# Patient Record
Sex: FEMALE | Race: WHITE | Employment: UNEMPLOYED | URBAN - METROPOLITAN AREA
[De-identification: names, ages, dates, MRNs, and addresses within clinical notes are randomized per-mention and may not be internally consistent; named-entity substitution may affect disease eponyms.]

---

## 2021-07-09 ENCOUNTER — APPOINTMENT (OUTPATIENT)
Dept: GENERAL RADIOLOGY | Age: 31
End: 2021-07-09

## 2021-07-09 ENCOUNTER — HOSPITAL ENCOUNTER (EMERGENCY)
Age: 31
Discharge: HOME | End: 2021-07-09
Attending: STUDENT IN AN ORGANIZED HEALTH CARE EDUCATION/TRAINING PROGRAM

## 2021-07-09 VITALS
WEIGHT: 185 LBS | BODY MASS INDEX: 29.73 KG/M2 | RESPIRATION RATE: 16 BRPM | SYSTOLIC BLOOD PRESSURE: 128 MMHG | DIASTOLIC BLOOD PRESSURE: 75 MMHG | OXYGEN SATURATION: 100 % | TEMPERATURE: 97.8 F | HEART RATE: 66 BPM | HEIGHT: 66 IN

## 2021-07-09 DIAGNOSIS — F11.93 OPIATE WITHDRAWAL (HCC): Primary | ICD-10-CM

## 2021-07-09 LAB
ABSOLUTE EOS #: 0 K/UL (ref 0–0.4)
ABSOLUTE IMMATURE GRANULOCYTE: ABNORMAL K/UL (ref 0–0.3)
ABSOLUTE LYMPH #: 1.33 K/UL (ref 1–4.8)
ABSOLUTE MONO #: 0.44 K/UL (ref 0.1–1.3)
ALBUMIN SERPL-MCNC: 4.8 G/DL (ref 3.5–5.2)
ALBUMIN/GLOBULIN RATIO: ABNORMAL (ref 1–2.5)
ALP BLD-CCNC: 109 U/L (ref 35–104)
ALT SERPL-CCNC: 23 U/L (ref 5–33)
ANION GAP SERPL CALCULATED.3IONS-SCNC: 13 MMOL/L (ref 9–17)
AST SERPL-CCNC: 28 U/L
BASOPHILS # BLD: 0 % (ref 0–2)
BASOPHILS ABSOLUTE: 0 K/UL (ref 0–0.2)
BILIRUB SERPL-MCNC: 0.5 MG/DL (ref 0.3–1.2)
BUN BLDV-MCNC: 14 MG/DL (ref 6–20)
BUN/CREAT BLD: ABNORMAL (ref 9–20)
CALCIUM SERPL-MCNC: 9.6 MG/DL (ref 8.6–10.4)
CHLORIDE BLD-SCNC: 100 MMOL/L (ref 98–107)
CO2: 23 MMOL/L (ref 20–31)
CREAT SERPL-MCNC: 0.7 MG/DL (ref 0.5–0.9)
DIFFERENTIAL TYPE: ABNORMAL
EOSINOPHILS RELATIVE PERCENT: 0 % (ref 0–4)
GFR AFRICAN AMERICAN: >60 ML/MIN
GFR NON-AFRICAN AMERICAN: >60 ML/MIN
GFR SERPL CREATININE-BSD FRML MDRD: ABNORMAL ML/MIN/{1.73_M2}
GFR SERPL CREATININE-BSD FRML MDRD: ABNORMAL ML/MIN/{1.73_M2}
GLUCOSE BLD-MCNC: 135 MG/DL (ref 70–99)
HCG QUALITATIVE: NEGATIVE
HCT VFR BLD CALC: 43 % (ref 36–46)
HEMOGLOBIN: 14.5 G/DL (ref 12–16)
IMMATURE GRANULOCYTES: ABNORMAL %
LYMPHOCYTES # BLD: 9 % (ref 24–44)
MCH RBC QN AUTO: 28.9 PG (ref 26–34)
MCHC RBC AUTO-ENTMCNC: 33.8 G/DL (ref 31–37)
MCV RBC AUTO: 85.4 FL (ref 80–100)
MONOCYTES # BLD: 3 % (ref 1–7)
MORPHOLOGY: NORMAL
NRBC AUTOMATED: ABNORMAL PER 100 WBC
PDW BLD-RTO: 12.6 % (ref 11.5–14.9)
PLATELET # BLD: 397 K/UL (ref 150–450)
PLATELET ESTIMATE: ABNORMAL
PMV BLD AUTO: 7 FL (ref 6–12)
POTASSIUM SERPL-SCNC: 3.9 MMOL/L (ref 3.7–5.3)
RBC # BLD: 5.03 M/UL (ref 4–5.2)
RBC # BLD: ABNORMAL 10*6/UL
SEG NEUTROPHILS: 88 % (ref 36–66)
SEGMENTED NEUTROPHILS ABSOLUTE COUNT: 13.03 K/UL (ref 1.3–9.1)
SODIUM BLD-SCNC: 136 MMOL/L (ref 135–144)
TOTAL PROTEIN: 9 G/DL (ref 6.4–8.3)
WBC # BLD: 14.8 K/UL (ref 3.5–11)
WBC # BLD: ABNORMAL 10*3/UL

## 2021-07-09 PROCEDURE — 80053 COMPREHEN METABOLIC PANEL: CPT

## 2021-07-09 PROCEDURE — 6360000002 HC RX W HCPCS: Performed by: STUDENT IN AN ORGANIZED HEALTH CARE EDUCATION/TRAINING PROGRAM

## 2021-07-09 PROCEDURE — 96374 THER/PROPH/DIAG INJ IV PUSH: CPT

## 2021-07-09 PROCEDURE — 36415 COLL VENOUS BLD VENIPUNCTURE: CPT

## 2021-07-09 PROCEDURE — 84703 CHORIONIC GONADOTROPIN ASSAY: CPT

## 2021-07-09 PROCEDURE — 96375 TX/PRO/DX INJ NEW DRUG ADDON: CPT

## 2021-07-09 PROCEDURE — 71045 X-RAY EXAM CHEST 1 VIEW: CPT

## 2021-07-09 PROCEDURE — 85025 COMPLETE CBC W/AUTO DIFF WBC: CPT

## 2021-07-09 PROCEDURE — 2580000003 HC RX 258: Performed by: STUDENT IN AN ORGANIZED HEALTH CARE EDUCATION/TRAINING PROGRAM

## 2021-07-09 PROCEDURE — 99283 EMERGENCY DEPT VISIT LOW MDM: CPT

## 2021-07-09 RX ORDER — 0.9 % SODIUM CHLORIDE 0.9 %
1000 INTRAVENOUS SOLUTION INTRAVENOUS ONCE
Status: COMPLETED | OUTPATIENT
Start: 2021-07-09 | End: 2021-07-09

## 2021-07-09 RX ORDER — ONDANSETRON 2 MG/ML
4 INJECTION INTRAMUSCULAR; INTRAVENOUS ONCE
Status: COMPLETED | OUTPATIENT
Start: 2021-07-09 | End: 2021-07-09

## 2021-07-09 RX ADMIN — SODIUM CHLORIDE 1000 ML: 9 INJECTION, SOLUTION INTRAVENOUS at 11:02

## 2021-07-09 RX ADMIN — ONDANSETRON 4 MG: 2 INJECTION, SOLUTION INTRAMUSCULAR; INTRAVENOUS at 11:03

## 2021-07-09 ASSESSMENT — ENCOUNTER SYMPTOMS
EYE ITCHING: 0
NAUSEA: 1
COUGH: 0
SHORTNESS OF BREATH: 0
ABDOMINAL PAIN: 0
COLOR CHANGE: 0
DIARRHEA: 1
FACIAL SWELLING: 0
VOMITING: 1
PHOTOPHOBIA: 0
RHINORRHEA: 0

## 2021-07-09 ASSESSMENT — PAIN DESCRIPTION - LOCATION: LOCATION: ABDOMEN

## 2021-07-09 ASSESSMENT — PAIN DESCRIPTION - PAIN TYPE: TYPE: ACUTE PAIN

## 2021-07-09 NOTE — ED NOTES
Patient provided AOD detox information and list of inpatient agencies. Patient reports she has no insurance and is a [facility] resident. Counselor to look for other resources for patient due to lack of insurance. @ [phone] counselor called [facility] and spoke with Peña Hartman and she reports they can accept without insurance. Counselor provided patient with number for her to call to do a prescreen over the phone for admission. [phone]. [facility] will accept patient and RN to fax lab work and doctors note for review of admission clearance.

## 2021-07-09 NOTE — ED PROVIDER NOTES
EMERGENCY DEPARTMENT ENCOUNTER    Pt Name: Slime Goldberg  MRN: [id number]  [place] 1990  Date of evaluation: 7/9/21  CHIEF COMPLAINT       Chief Complaint   Patient presents with    Shortness of Breath     HISTORY OF PRESENT ILLNESS   HPI  66-year-old female presents for evaluation of multiple complaints. Patient is a daily intranasal fentanyl user. She last used last night around 8 PM.  She is now reporting some generalized body aches, nausea vomiting, shortness of breath. Symptoms are moderate and intermittent. Patient has used frequently for the past several years. No other associated symptoms. No home treatment prior to arrival.  No other recent illness. Patient is requesting detox and rehab resources for opiate use. REVIEW OF SYSTEMS     Review of Systems   Constitutional: Negative for chills and fatigue. HENT: Negative for facial swelling, postnasal drip and rhinorrhea. Eyes: Negative for photophobia and itching. Respiratory: Negative for cough and shortness of breath. Cardiovascular: Negative for chest pain and leg swelling. Gastrointestinal: Positive for diarrhea, nausea and vomiting. Negative for abdominal pain. Genitourinary: Negative for dysuria, flank pain and hematuria. Musculoskeletal: Positive for myalgias. Negative for arthralgias and joint swelling. Skin: Negative for color change and rash. Neurological: Negative for dizziness, numbness and headaches. PASTMEDICAL HISTORY   History reviewed. No pertinent past medical history. Past Problem List  There is no problem list on file for this patient. SURGICAL HISTORY     History reviewed. No pertinent surgical history. CURRENT MEDICATIONS       There are no discharge medications for this patient. ALLERGIES     has No Known Allergies. FAMILY HISTORY     has no family status information on file.       SOCIAL HISTORY       Social History     Tobacco Use    Smoking status: Current Every Day Smoker    here.  XR CHEST PORTABLE   Final Result   No acute airspace disease identified. LABS: All lab results were reviewed by myself, and all abnormals are listed below. Labs Reviewed   CBC WITH AUTO DIFFERENTIAL - Abnormal; Notable for the following components:       Result Value    WBC 14.8 (*)     Seg Neutrophils 88 (*)     Lymphocytes 9 (*)     Segs Absolute 13.03 (*)     All other components within normal limits   COMPREHENSIVE METABOLIC PANEL W/ REFLEX TO MG FOR LOW K - Abnormal; Notable for the following components:    Glucose 135 (*)     Alkaline Phosphatase 109 (*)     Total Protein 9.0 (*)     All other components within normal limits   HCG, SERUM, QUALITATIVE       EMERGENCY DEPARTMENTCOURSE:         Vitals:    Vitals:    07/09/21 1013   BP: 128/75   Pulse: 66   Resp: 16   Temp: 97.8 °F (36.6 °C)   TempSrc: Oral   SpO2: 100%   Weight: 185 lb (83.9 kg)   Height: 5' 6\" (1.676 m)       The patient was given the following medications while in the emergency department:  Orders Placed This Encounter   Medications    0.9 % sodium chloride IV bolus 1,000 mL    ondansetron (ZOFRAN) injection 4 mg     CONSULTS:  None    FINAL IMPRESSION      1. Opiate withdrawal [facility])          DISPOSITION/PLAN   DISPOSITION Decision To Discharge 07/09/2021 03:45:51 PM      PATIENT REFERRED TO:  [facility]  [address] [place] [address] [id number] [phone]  Call   As needed    DISCHARGE MEDICATIONS:  There are no discharge medications for this patient.     Juan Bonilla MD  Attending Emergency Physician                    Juan Bonilla MD  07/09/21 0674

## 2021-07-09 NOTE — ED NOTES
Mode of arrival (squad #, walk in, police, etc) : walk in        Chief complaint(s): SOB and withdrawing        Arrival Note (brief scenario, treatment PTA, etc). : Pt states she has had SOB x8 hours and detoxing from fentanyl with last use yesterday. Pt states she detoxed from fentanyl x6 months ago. A&Ox4, NAD, respirations even and unlabored with some SOB. 100% on room air, ambulatory with slow steady gait. C= \"Have you ever felt that you should Cut down on your drinking? \"  No  A= \"Have people Annoyed you by criticizing your drinking? \"  No  G= \"Have you ever felt bad or Guilty about your drinking? \"  No  E= \"Have you ever had a drink as an Eye-opener first thing in the morning to steady your nerves or to help a hangover? \"  No      Deferred []      Reason for deferring: N/A    *If yes to two or more: probable alcohol abuse. Shane Flowers RN  07/09/21 5835

## 2024-05-03 ENCOUNTER — HOSPITAL ENCOUNTER (OUTPATIENT)
Age: 34
Setting detail: SPECIMEN
Discharge: HOME | End: 2024-05-03

## 2025-01-31 ENCOUNTER — HOSPITAL ENCOUNTER (OUTPATIENT)
Age: 35
Setting detail: SPECIMEN
Discharge: HOME | End: 2025-01-31
Payer: MEDICAID

## 2025-01-31 LAB
ALBUMIN SERPL-MCNC: 4.4 G/DL (ref 3.5–5.2)
ALBUMIN/GLOB SERPL: 1.3 {RATIO} (ref 1–2.5)
ALP SERPL-CCNC: 89 U/L (ref 35–104)
ALT SERPL-CCNC: 58 U/L (ref 10–35)
ANION GAP SERPL CALCULATED.3IONS-SCNC: 13 MMOL/L (ref 9–16)
AST SERPL-CCNC: 22 U/L (ref 10–35)
BILIRUB SERPL-MCNC: 0.3 MG/DL (ref 0–1.2)
BUN SERPL-MCNC: 11 MG/DL (ref 6–20)
BUN/CREAT SERPL: 14 (ref 9–20)
CALCIUM SERPL-MCNC: 9.4 MG/DL (ref 8.6–10.4)
CHLORIDE SERPL-SCNC: 102 MMOL/L (ref 98–107)
CO2 SERPL-SCNC: 23 MMOL/L (ref 20–31)
CREAT SERPL-MCNC: 0.8 MG/DL (ref 0.5–0.9)
ERYTHROCYTE [DISTWIDTH] IN BLOOD BY AUTOMATED COUNT: 12.4 % (ref 11.8–14.4)
GFR, ESTIMATED: >90 ML/MIN/1.73M2
GLUCOSE SERPL-MCNC: 134 MG/DL (ref 74–99)
HAV IGM SERPL QL IA: NONREACTIVE
HBV CORE IGM SERPL QL IA: NONREACTIVE
HBV SURFACE AB SERPL IA-ACNC: 30.8 MIU/ML
HBV SURFACE AG SERPL QL IA: NONREACTIVE
HCG SERPL QL: NEGATIVE
HCT VFR BLD AUTO: 42.1 % (ref 36.3–47.1)
HCV AB SERPL QL IA: NONREACTIVE
HGB BLD-MCNC: 14.1 G/DL (ref 11.9–15.1)
HIV 1+2 AB+HIV1 P24 AG SERPL QL IA: NONREACTIVE
MCH RBC QN AUTO: 29.2 PG (ref 25.2–33.5)
MCHC RBC AUTO-ENTMCNC: 33.5 G/DL (ref 28.4–34.8)
MCV RBC AUTO: 87.2 FL (ref 82.6–102.9)
NRBC BLD-RTO: 0 PER 100 WBC
PLATELET # BLD AUTO: 315 K/UL (ref 138–453)
PMV BLD AUTO: 9.2 FL (ref 8.1–13.5)
POTASSIUM SERPL-SCNC: 3.7 MMOL/L (ref 3.7–5.3)
PROT SERPL-MCNC: 7.8 G/DL (ref 6.6–8.7)
RBC # BLD AUTO: 4.83 M/UL (ref 3.95–5.11)
SODIUM SERPL-SCNC: 138 MMOL/L (ref 136–145)
T PALLIDUM AB SER QL IA: NONREACTIVE
T4 FREE SERPL-MCNC: 0.8 NG/DL (ref 0.9–1.7)
TSH SERPL DL<=0.05 MIU/L-ACNC: 1.38 UIU/ML (ref 0.27–4.2)
WBC OTHER # BLD: 10.1 K/UL (ref 3.5–11.3)

## 2025-01-31 PROCEDURE — 80053 COMPREHEN METABOLIC PANEL: CPT

## 2025-01-31 PROCEDURE — 84703 CHORIONIC GONADOTROPIN ASSAY: CPT

## 2025-01-31 PROCEDURE — 87522 HEPATITIS C REVRS TRNSCRPJ: CPT

## 2025-01-31 PROCEDURE — 86317 IMMUNOASSAY INFECTIOUS AGENT: CPT

## 2025-01-31 PROCEDURE — 84439 ASSAY OF FREE THYROXINE: CPT

## 2025-01-31 PROCEDURE — 36415 COLL VENOUS BLD VENIPUNCTURE: CPT

## 2025-01-31 PROCEDURE — 84443 ASSAY THYROID STIM HORMONE: CPT

## 2025-01-31 PROCEDURE — 85027 COMPLETE CBC AUTOMATED: CPT

## 2025-01-31 PROCEDURE — 86780 TREPONEMA PALLIDUM: CPT

## 2025-01-31 PROCEDURE — 80074 ACUTE HEPATITIS PANEL: CPT

## 2025-01-31 PROCEDURE — 87389 HIV-1 AG W/HIV-1&-2 AB AG IA: CPT

## 2025-02-04 LAB
HCV RNA # SERPL NAA+PROBE: NOT DETECTED {COPIES}/ML
SPECIMEN SOURCE: NORMAL

## 2025-06-17 ENCOUNTER — HOSPITAL ENCOUNTER (OUTPATIENT)
Age: 35
Setting detail: SPECIMEN
Discharge: HOME | End: 2025-06-17

## 2025-06-18 ENCOUNTER — HOSPITAL ENCOUNTER (OUTPATIENT)
Age: 35
Setting detail: SPECIMEN
Discharge: HOME | End: 2025-06-18
Payer: MEDICAID

## 2025-06-18 LAB
ALBUMIN SERPL-MCNC: 3.8 G/DL (ref 3.5–5.2)
ALBUMIN/GLOB SERPL: 1.3 {RATIO} (ref 1–2.5)
ALP SERPL-CCNC: 80 U/L (ref 35–104)
ALT SERPL-CCNC: 28 U/L (ref 10–35)
ANION GAP SERPL CALCULATED.3IONS-SCNC: 7 MMOL/L (ref 9–16)
AST SERPL-CCNC: 23 U/L (ref 10–35)
BILIRUB SERPL-MCNC: <0.2 MG/DL (ref 0–1.2)
BUN SERPL-MCNC: 9 MG/DL (ref 6–20)
BUN/CREAT SERPL: 13 (ref 9–20)
CALCIUM SERPL-MCNC: 8.8 MG/DL (ref 8.6–10.4)
CHLORIDE SERPL-SCNC: 104 MMOL/L (ref 98–107)
CO2 SERPL-SCNC: 28 MMOL/L (ref 20–31)
CREAT SERPL-MCNC: 0.7 MG/DL (ref 0.5–0.9)
ERYTHROCYTE [DISTWIDTH] IN BLOOD BY AUTOMATED COUNT: 12.5 % (ref 11.8–14.4)
GFR, ESTIMATED: >90 ML/MIN/1.73M2
GLUCOSE SERPL-MCNC: 93 MG/DL (ref 74–99)
HAV IGM SERPL QL IA: NONREACTIVE
HBV CORE IGM SERPL QL IA: NONREACTIVE
HBV SURFACE AB SERPL IA-ACNC: 44 MIU/ML
HBV SURFACE AG SERPL QL IA: NONREACTIVE
HCG SERPL QL: NEGATIVE
HCT VFR BLD AUTO: 41.8 % (ref 36.3–47.1)
HCV AB SERPL QL IA: NONREACTIVE
HGB BLD-MCNC: 13.5 G/DL (ref 11.9–15.1)
HIV 1+2 AB+HIV1 P24 AG SERPL QL IA: NONREACTIVE
MCH RBC QN AUTO: 28.5 PG (ref 25.2–33.5)
MCHC RBC AUTO-ENTMCNC: 32.3 G/DL (ref 28.4–34.8)
MCV RBC AUTO: 88.4 FL (ref 82.6–102.9)
NRBC BLD-RTO: 0 PER 100 WBC
PLATELET # BLD AUTO: 276 K/UL (ref 138–453)
PMV BLD AUTO: 9.2 FL (ref 8.1–13.5)
POTASSIUM SERPL-SCNC: 4.1 MMOL/L (ref 3.7–5.3)
PROT SERPL-MCNC: 6.8 G/DL (ref 6.6–8.7)
RBC # BLD AUTO: 4.73 M/UL (ref 3.95–5.11)
SODIUM SERPL-SCNC: 139 MMOL/L (ref 136–145)
T PALLIDUM AB SER QL IA: NONREACTIVE
WBC OTHER # BLD: 6.6 K/UL (ref 3.5–11.3)

## 2025-06-18 PROCEDURE — 84703 CHORIONIC GONADOTROPIN ASSAY: CPT

## 2025-06-18 PROCEDURE — 85027 COMPLETE CBC AUTOMATED: CPT

## 2025-06-18 PROCEDURE — 36415 COLL VENOUS BLD VENIPUNCTURE: CPT

## 2025-06-18 PROCEDURE — 87389 HIV-1 AG W/HIV-1&-2 AB AG IA: CPT

## 2025-06-18 PROCEDURE — 80053 COMPREHEN METABOLIC PANEL: CPT

## 2025-06-18 PROCEDURE — 86780 TREPONEMA PALLIDUM: CPT

## 2025-06-18 PROCEDURE — 86317 IMMUNOASSAY INFECTIOUS AGENT: CPT

## 2025-06-18 PROCEDURE — 80074 ACUTE HEPATITIS PANEL: CPT
